# Patient Record
(demographics unavailable — no encounter records)

---

## 2024-11-14 NOTE — SOCIAL HISTORY
[House] : [unfilled] lives in a house  [Radiator/Baseboard] : heating provided by radiator(s)/baseboard(s) [Window Units] : air conditioning provided by window units [Dry] : dry [None] : none [] :  [Humidifier] : does not use a humidifier [Dehumidifier] : does not use a dehumidifier [Cockroaches] : Patient states that there are no cockroaches in the home [Dust Mite Covers] : does not have dust mite covers [Feather Pillows] : does not have feather pillows [Feather Comforter] : does not have a feather comforter [Bedroom] : not in the bedroom [Basement] : not in the basement [Living Area] : not in the living area [Smokers in Household] : there are no smokers in the home [de-identified] : stairs

## 2024-11-14 NOTE — ASSESSMENT
[FreeTextEntry1] : 1. AD/CD - recommend continued derm f/u  2. FA - avoid shellfish - immunocap to shellfish  3. Sulfite sensitivity - avoid fermented beverages as tolerated.   4. ?DA - avoiding sulfa drugs and pcn  5. ?AR/AC - will check immunocap to environmental allergens.

## 2024-11-14 NOTE — HISTORY OF PRESENT ILLNESS
[Asthma] : asthma [Venom Reactions] : venom reactions [None] : The patient is currently asymptomatic [de-identified] : LEVON BENITEZ is a 61 year yo female who has had eczema on and off since 2020 she states the first time she had it, it went away after a month, she saw a dermatologist who did a punch biopsy last year and told her it was atopic dermatitis. She states the outbreak did not last long but was very itchy, this past year she got a significant outbreak on her arms and legs. She states she may believe it is stress induced as every time it has occurred a major life event has happened right before. She has tried various creams with minimal relief. She has hay fever managed by Zyrtec 10 mg daily since April but has never been tested for it. She is known allergic to shellfish after reaction at 12 years of age and has avoided it ever since for the most part 10 years ago she accidental consumed some and immediately got hives. She also gets blotchy after having some chandler. She is allergic to penicillin and sulfa drugs. She is here to see if there are any allergy components to her skin issue.